# Patient Record
Sex: MALE | Race: OTHER | HISPANIC OR LATINO | Employment: UNEMPLOYED | URBAN - METROPOLITAN AREA
[De-identification: names, ages, dates, MRNs, and addresses within clinical notes are randomized per-mention and may not be internally consistent; named-entity substitution may affect disease eponyms.]

---

## 2019-07-13 ENCOUNTER — HOSPITAL ENCOUNTER (EMERGENCY)
Facility: HOSPITAL | Age: 49
Discharge: HOME | End: 2019-07-13
Attending: EMERGENCY MEDICINE | Admitting: EMERGENCY MEDICINE

## 2019-07-13 VITALS
HEART RATE: 91 BPM | RESPIRATION RATE: 18 BRPM | HEIGHT: 68 IN | DIASTOLIC BLOOD PRESSURE: 90 MMHG | SYSTOLIC BLOOD PRESSURE: 139 MMHG | OXYGEN SATURATION: 97 % | WEIGHT: 220 LBS | TEMPERATURE: 98.4 F | BODY MASS INDEX: 33.34 KG/M2

## 2019-07-13 DIAGNOSIS — L84 PRE-ULCERATIVE CORN OR CALLOUS: Primary | ICD-10-CM

## 2019-07-13 PROCEDURE — 99282 EMERGENCY DEPT VISIT SF MDM: CPT

## 2019-07-13 RX ORDER — CLONAZEPAM 1 MG/1
1 TABLET ORAL 3 TIMES DAILY PRN
COMMUNITY

## 2019-07-13 NOTE — ED NOTES
Pt reports he has had an infection on his left big toe that he noticed today.     Marcela Luke, RN  07/13/19 9923

## 2019-07-13 NOTE — ED NOTES
"Pt presents with pain to left foot big toe. States he believes he had a callous so he sprayed it with Dr. Carroll foot spray. Today states pain is 9/10. Pt states pain is sharp like \"a prick.\" No other complaints at this time. Only medical hx is anxiety.      Mckenzie Grubbs RN  07/13/19 [id number]    "

## 2019-07-13 NOTE — ED PROVIDER NOTES
EMERGENCY DEPARTMENT ENCOUNTER    Room Number:  07/07  Date seen:  7/13/2019  Time seen: 7:24 PM  PCP: Provider, No Known  Historian: Patient       HPI:  Chief Complaint: Toe Pain   A complete HPI/ROS/PMH/PSH/SH/FH are unobtainable due to: none   Context: Sam Olivia is a 49 y.o. male who presents to the ED c/o pain the the medial aspect of the great toe of the L foot that began yesterday. Pt denies fever, injury to toe, trauma to toe, or open wound. Per pt, he sprayed toe with Dr. Carroll foot spray yesterday but had worsening pain from that time. Pt affirms he has no hx of diabetes.     Pain Location: L great toe   Radiation: none   Quality: pain   Intensity/Severity: mild   Duration: 1 day   Onset quality: gradual   Timing: constant   Progression: worsened   Aggravating Factors: foot spray   Alleviating Factors: none   Previous Episodes: none   Treatment before arrival: Pt sprayed toe with Dr. Mcneill's foot spray.   Associated Symptoms: none           PAST MEDICAL HISTORY  Active Ambulatory Problems     Diagnosis Date Noted   • No Active Ambulatory Problems     Resolved Ambulatory Problems     Diagnosis Date Noted   • No Resolved Ambulatory Problems     No Additional Past Medical History         PAST SURGICAL HISTORY  History reviewed. No pertinent surgical history.      FAMILY HISTORY  History reviewed. No pertinent family history.      SOCIAL HISTORY  Social History     Socioeconomic History   • Marital status:      Spouse name: Not on file   • Number of children: Not on file   • Years of education: Not on file   • Highest education level: Not on file   Tobacco Use   • Smoking status: Never Smoker   Substance and Sexual Activity   • Alcohol use: No     Frequency: Never         ALLERGIES  Patient has no known allergies.        REVIEW OF SYSTEMS  Review of Systems   Constitutional: Negative for fever.   Respiratory: Negative for shortness of breath.    Cardiovascular: Negative for chest pain.    Musculoskeletal: Positive for arthralgias (L great toe).            PHYSICAL EXAM  ED Triage Vitals [07/13/19 1833]   Temp Heart Rate Resp BP SpO2   98.5 °F (36.9 °C) 103 18 149/94 97 %      Temp src Heart Rate Source Patient Position BP Location FiO2 (%)   Tympanic -- -- -- --         GENERAL: no acute distress  HENT: nares patent  EYES: no scleral icterus  CV: regular rhythm, normal rate, 2+ DP and PT pulses to LLE  RESPIRATORY: normal effort  ABDOMEN: soft  MUSCULOSKELETAL: no deformity  NEURO: alert, moves all extremities, follows commands  SKIN: warm, dry. Callous formation to plantar and medial aspect of L great toe, skin intact without erythema, warmth or drainage. Pt has point tenderness of planter aspect, no point tenderness of first MTP joint.     Vital signs and nursing notes reviewed.      Procedures      MEDICATIONS GIVEN IN ER  Medications - No data to display                PROGRESS AND CONSULTS     1926: Upon pt exam, discussed with pt the lack of acute findings of PEx and no sign of infection and pt's symptoms not typical for gout due to location of pain. Informed pt of plan for discharge with outpatient follow up with Dr. Sigala. Pt directed to take NSAIDs for pain management, maintain warm water soaks, and discontinue foot spray. Pt given RTER instructions for development of erythema or edema. Pt understands and agrees with the plan, all questions answered.        MEDICAL DECISION MAKING    Painful callus of the foot.  No evidence of infection.  Pain not involving the MTP joint and the pain is also very superficial therefore I do not suspect gout.  Follow-up with podiatry.  Return precautions were discussed.    MDM           DIAGNOSIS  Final diagnoses:   Pre-ulcerative corn or callous         DISPOSITION  DISCHARGE    Patient discharged in stable condition.    Reviewed implications of results, diagnosis, meds, responsibility to follow up, warning signs and symptoms of possible worsening,  potential complications and reasons to return to ER.    Patient/Family voiced understanding of above instructions.    Discussed plan for discharge, as there is no emergent indication for admission. Patient referred to primary care provider for BP management due to today's BP. Pt/family is agreeable and understands need for follow up and repeat testing.  Pt is aware that discharge does not mean that nothing is wrong but it indicates no emergency is present that requires admission and they must continue care with follow-up as given below or physician of their choice.     FOLLOW-UP  Isaías Sigala, [facility]  [address] [address]  [phone]    Schedule an appointment as soon as possible for a visit            Medication List      No changes were made to your prescriptions during this visit.                   Latest Documented Vital Signs:  As of 7:31 PM  BP- 149/94 HR- 103 Temp- 98.5 °F (36.9 °C) (Tympanic) O2 sat- 97%        --  Documentation assistance provided by vasyl Staley for Dr. SATISH Barrera MD.  Information recorded by the scribe was done at my direction and has been verified and validated by me.      Please note that portions of this were completed with a voice recognition program.                Mckenzie Staley  07/13/19 1931       Saravanan Barrera MD  07/13/19 [id number]

## 2019-07-13 NOTE — DISCHARGE INSTRUCTIONS
Soak in warm water nightly.  Take ibuprofen or tylenol as needed for pain.  Follow up with podiatry for further care.